# Patient Record
Sex: MALE | Race: BLACK OR AFRICAN AMERICAN
[De-identification: names, ages, dates, MRNs, and addresses within clinical notes are randomized per-mention and may not be internally consistent; named-entity substitution may affect disease eponyms.]

---

## 2018-06-05 ENCOUNTER — HOSPITAL ENCOUNTER (EMERGENCY)
Dept: HOSPITAL 72 - EMR | Age: 33
Discharge: HOME | End: 2018-06-05
Payer: SELF-PAY

## 2018-06-05 VITALS — HEIGHT: 70 IN | BODY MASS INDEX: 30.78 KG/M2 | WEIGHT: 215 LBS

## 2018-06-05 VITALS — DIASTOLIC BLOOD PRESSURE: 87 MMHG | SYSTOLIC BLOOD PRESSURE: 133 MMHG
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
## 2018-06-05 DIAGNOSIS — S93.492A: Primary | ICD-10-CM
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
## 2018-06-05 PROCEDURE — 29515 APPLICATION SHORT LEG SPLINT: CPT

## 2018-06-05 PROCEDURE — 99283 EMERGENCY DEPT VISIT LOW MDM: CPT

## 2018-06-05 NOTE — DIAGNOSTIC IMAGING REPORT
Indication: Pain in left ankle, twisted one day prior

 

Technique: 3 views of the left ankle

 

Comparison: none 

 

Findings: No acute fractures. No dislocations. The joint spaces are preserved. Soft

tissue swelling overlies lateral malleolus.

 

Impression: No acute bony trauma. Evidence of lateral soft tissue injury

## 2018-06-05 NOTE — EMERGENCY ROOM REPORT
History of Present Illness


General


Chief Complaint:  Lower Extremity Injury


Source:  Patient





Present Illness


HPI


Is a 32-year-old male with no past medical history.  Earlier this morning he 

was running and twisted his ankle.  Now swollen.  Hard Time walking on it.  

Pain is 7 out of 10.  Worse with weightbearing.  No fever chills but no nausea 

no vomiting.  No other injury.


Allergies:  


Coded Allergies:  


     No Known Allergies (Unverified , 6/5/18)





Patient History


Past Medical History:  see triage record, old chart reviewed


Past Surgical History:  none


Pertinent Family History:  none


Social History:  Denies: smoking


Immunizations:  other


Reviewed Nursing Documentation:  PMH: Agreed; PSxH: Agreed





Nursing Documentation-PMH


Past Medical History:  No Stated History





Review of Systems


Eye:  Denies: eye pain, blurred vision


ENT:  Denies: ear pain, nose congestion, throat swelling


Respiratory:  Denies: cough, shortness of breath


Cardiovascular:  Denies: chest pain, palpitations


Gastrointestinal:  Denies: abdominal pain, diarrhea, nausea, vomiting


Musculoskeletal:  Reports: joint pain, joint swelling; Denies: back pain


Skin:  Denies: rash


Neurological:  Denies: headache, numbness


Endocrine:  Denies: increased thirst, increased urine


Hematologic/Lymphatic:  Denies: easy bruising


All Other Systems:  negative except mentioned in HPI





Physical Exam





Vital Signs








  Date Time  Temp Pulse Resp B/P (MAP) Pulse Ox O2 Delivery O2 Flow Rate FiO2


 


6/5/18 02:17 98.4 81 16 133/87 96 Room Air  





 98.4       





vitals normal


Sp02 EP Interpretation:  reviewed, normal


General Appearance:  well appearing, no apparent distress, alert


Head:  normocephalic, atraumatic


Eyes:  bilateral eye PERRL, bilateral eye EOMI


ENT:  hearing grossly normal, normal pharynx


Neck:  full range of motion, supple, no meningismus


Respiratory:  chest non-tender, lungs clear, normal breath sounds


Cardiovascular #1:  regular rate, rhythm, no murmur


Gastrointestinal:  normal bowel sounds, non tender, no mass, no organomegaly, 

no bruit, non-distended


Musculoskeletal:  back normal, normal range of motion, other - Left ankle: He 

has edema and tenderness to the lateral malleolus.  Ankle is stable.  Sensation 

normal.  Pulses normal.


Neurologic:  alert, oriented x3


Psychiatric:  mood/affect normal


Skin:  warm/dry





Procedures


Splinting


Splinting :  


   Consent:  Verbal


   Location:  left ankle


   Pre-Made Type:  aircast


   Pre-Proc Neuro Vasc Exam:  normal


   Post-Proc Neuro Vasc Exam:  normal


   Patient Tolerated:  Well


   Complications:  None





Medical Decision Making


Diagnostic Impression:  


 Primary Impression:  


 Left ankle sprain


 Qualified Codes:  S93.492A - Sprain of other ligament of left ankle, initial 

encounter


ER Course


Patient with a left ankle sprain.  No fracture or dislocation.  Patient 

splinted and discharged home.


Other X-Ray Diagnostic Results


Other X-Ray Diagnostic Results :  


   X-Ray ordered:  left ankle x-rays


   # of Views/Limited Vs Complete:  3 View


   Indication:  Pain


   EP Interpretation:  Yes


   Interpretation:  no dislocation, no fractures, other - STS


   Impression:  Other


   Electronically Signed by:  Joel Sexton MD





Last Vital Signs








  Date Time  Temp Pulse Resp B/P (MAP) Pulse Ox O2 Delivery O2 Flow Rate FiO2


 


6/5/18 02:46 98.4       


 


6/5/18 02:17  81 16 133/87 96 Room Air  








Status:  improved


Disposition:  HOME, SELF-CARE


Condition:  Stable


Scripts


Ibuprofen* (MOTRIN*) 600 Mg Tablet


600 MG ORAL THREE TIMES A DAY, #30 TAB 0 Refills


   Prov: JOEL SEXTON M.D.         6/5/18


Referrals:  


NOT CHOSEN [facility],REFERRING (PCP)





Additional Instructions:  


Rest.  Elevate leg.  Use crutches.  Wear splint.  Ice pack to the area . Return 

if worse.  Follow-up your doctor in 7 days.











JOEL SEXTON M.D. Jun 5, 2018 02:49